# Patient Record
(demographics unavailable — no encounter records)

---

## 2024-11-11 NOTE — REASON FOR VISIT
[Initial Consultation] : an initial consultation for [FreeTextEntry2] : referred by PCP to follow up for drainage from right ear started over a year no trauma, no hearing aids patient have difficulty hearing form both ears R>L

## 2024-11-11 NOTE — REVIEW OF SYSTEMS
[Hearing Loss] : hearing loss [Ear Drainage] : ear drainage [Negative] : Heme/Lymph [Patient Intake Form Reviewed] : Patient intake form was reviewed

## 2024-11-11 NOTE — PHYSICAL EXAM
[Normal] : mucosa is normal [Midline] : trachea located in midline position [de-identified] : LEFT IMPACTED CERUMEN REMOVED/ RIGHT EAR DISCHARGE/ INFECTED DEBRIS AND CERUMEN REMOVED

## 2024-11-11 NOTE — ASSESSMENT
[FreeTextEntry1] : AMOXACILLIN CORTISPORIN NEPHROLOGY AND PMD F/U AS SCHEDULED WATER PERCAUTIONS F/U 10 DAYS